# Patient Record
Sex: MALE | Race: WHITE | NOT HISPANIC OR LATINO | URBAN - METROPOLITAN AREA
[De-identification: names, ages, dates, MRNs, and addresses within clinical notes are randomized per-mention and may not be internally consistent; named-entity substitution may affect disease eponyms.]

---

## 2020-12-01 ENCOUNTER — OFFICE VISIT (OUTPATIENT)
Dept: URBAN - METROPOLITAN AREA CLINIC 121 | Facility: CLINIC | Age: 65
End: 2020-12-01

## 2020-12-23 ENCOUNTER — WALK IN (OUTPATIENT)
Dept: URGENT CARE | Age: 65
End: 2020-12-23

## 2020-12-23 VITALS
OXYGEN SATURATION: 97 % | SYSTOLIC BLOOD PRESSURE: 112 MMHG | DIASTOLIC BLOOD PRESSURE: 88 MMHG | HEART RATE: 108 BPM | TEMPERATURE: 99.6 F | RESPIRATION RATE: 16 BRPM

## 2020-12-23 DIAGNOSIS — R50.9 FEVER, UNSPECIFIED FEVER CAUSE: Primary | ICD-10-CM

## 2020-12-23 DIAGNOSIS — R11.10 VOMITING AND DIARRHEA: ICD-10-CM

## 2020-12-23 DIAGNOSIS — R19.7 VOMITING AND DIARRHEA: ICD-10-CM

## 2020-12-23 LAB
ANION GAP SERPL CALC-SCNC: 15 MMOL/L (ref 10–20)
BASOPHILS # BLD: 0 K/MCL (ref 0–0.3)
BASOPHILS NFR BLD: 0 %
BUN SERPL-MCNC: 22 MG/DL (ref 6–20)
BUN/CREAT SERPL: 20 (ref 7–25)
CALCIUM SERPL-MCNC: 10.1 MG/DL (ref 8.4–10.2)
CHLORIDE SERPL-SCNC: 103 MMOL/L (ref 98–107)
CO2 SERPL-SCNC: 25 MMOL/L (ref 21–32)
CREAT SERPL-MCNC: 1.1 MG/DL (ref 0.67–1.17)
DEPRECATED RDW RBC: 46.5 FL (ref 39–50)
EOSINOPHIL # BLD: 0 K/MCL (ref 0–0.5)
EOSINOPHIL NFR BLD: 0 %
ERYTHROCYTE [DISTWIDTH] IN BLOOD: 14.3 % (ref 11–15)
FASTING DURATION TIME PATIENT: ABNORMAL H
GFR SERPLBLD BASED ON 1.73 SQ M-ARVRAT: 70 ML/MIN/1.73M2
GLUCOSE SERPL-MCNC: 131 MG/DL (ref 65–99)
HCT VFR BLD CALC: 42.7 % (ref 39–51)
HGB BLD-MCNC: 15 G/DL (ref 13–17)
LYMPHOCYTES # BLD: 0.3 K/MCL (ref 1–4)
LYMPHOCYTES NFR BLD: 4 %
MCH RBC QN AUTO: 32 PG (ref 26–34)
MCHC RBC AUTO-ENTMCNC: 35.1 G/DL (ref 32–36.5)
MCV RBC AUTO: 91 FL (ref 78–100)
MONOCYTES # BLD: 0.4 K/MCL (ref 0.3–0.9)
MONOCYTES NFR BLD: 6 %
NEUTROPHILS # BLD: 6.1 K/MCL (ref 1.8–7.7)
NEUTROPHILS NFR BLD: 90 %
PLATELET # BLD AUTO: 151 K/MCL (ref 140–450)
POTASSIUM SERPL-SCNC: 4.1 MMOL/L (ref 3.4–5.1)
RAINBOW EXTRA TUBES HOLD SPECIMEN: NORMAL
RBC # BLD: 4.69 MIL/MCL (ref 4.5–5.9)
SARS-COV+SARS-COV-2 AG RESP QL IA.RAPID: NOT DETECTED
SODIUM SERPL-SCNC: 139 MMOL/L (ref 135–145)
WBC # BLD: 6.9 K/MCL (ref 4.2–11)

## 2020-12-23 PROCEDURE — 96360 HYDRATION IV INFUSION INIT: CPT | Performed by: PHYSICIAN ASSISTANT

## 2020-12-23 PROCEDURE — 80048 BASIC METABOLIC PNL TOTAL CA: CPT | Performed by: INTERNAL MEDICINE

## 2020-12-23 PROCEDURE — 99204 OFFICE O/P NEW MOD 45 MIN: CPT | Performed by: PHYSICIAN ASSISTANT

## 2020-12-23 PROCEDURE — 85025 COMPLETE CBC W/AUTO DIFF WBC: CPT | Performed by: INTERNAL MEDICINE

## 2020-12-23 PROCEDURE — 87426 SARSCOV CORONAVIRUS AG IA: CPT | Performed by: PHYSICIAN ASSISTANT

## 2020-12-23 PROCEDURE — 36415 COLL VENOUS BLD VENIPUNCTURE: CPT | Performed by: PHYSICIAN ASSISTANT

## 2020-12-23 RX ORDER — ONDANSETRON 4 MG/1
4 TABLET, ORALLY DISINTEGRATING ORAL EVERY 8 HOURS PRN
Qty: 21 TABLET | Refills: 0 | Status: SHIPPED | OUTPATIENT
Start: 2020-12-23

## 2020-12-23 RX ORDER — ONDANSETRON 4 MG/1
4 TABLET, ORALLY DISINTEGRATING ORAL EVERY 8 HOURS PRN
Qty: 21 TABLET | Refills: 0 | Status: SHIPPED | OUTPATIENT
Start: 2020-12-23 | End: 2020-12-23 | Stop reason: SDUPTHER

## 2020-12-23 RX ORDER — ALLOPURINOL 300 MG/1
300 TABLET ORAL DAILY
COMMUNITY

## 2021-01-22 ENCOUNTER — OFFICE VISIT (OUTPATIENT)
Dept: URBAN - METROPOLITAN AREA CLINIC 63 | Facility: CLINIC | Age: 66
End: 2021-01-22

## 2021-01-28 ENCOUNTER — OFFICE VISIT (OUTPATIENT)
Dept: URBAN - METROPOLITAN AREA SURGERY CENTER 4 | Facility: SURGERY CENTER | Age: 66
End: 2021-01-28

## 2021-02-01 LAB — PATHOLOGY (INDENTED REPORT): (no result)

## 2021-02-23 ENCOUNTER — OFFICE VISIT (OUTPATIENT)
Dept: URBAN - METROPOLITAN AREA CLINIC 63 | Facility: CLINIC | Age: 66
End: 2021-02-23

## 2021-10-13 ENCOUNTER — OFFICE VISIT (OUTPATIENT)
Dept: URBAN - METROPOLITAN AREA CLINIC 121 | Facility: CLINIC | Age: 66
End: 2021-10-13

## 2022-05-13 ENCOUNTER — OFFICE VISIT (OUTPATIENT)
Dept: URBAN - METROPOLITAN AREA CLINIC 63 | Facility: CLINIC | Age: 67
End: 2022-05-13

## 2022-07-09 ENCOUNTER — TELEPHONE ENCOUNTER (OUTPATIENT)
Dept: URBAN - METROPOLITAN AREA CLINIC 121 | Facility: CLINIC | Age: 67
End: 2022-07-09

## 2022-07-09 RX ORDER — OMEPRAZOLE 20 MG/1
CAPSULE, DELAYED RELEASE ORAL ONCE A DAY
Refills: 0 | OUTPATIENT
Start: 2021-02-23 | End: 2022-05-13

## 2022-07-09 RX ORDER — AMLODIPINE BESYLATE 10 MG/1
TABLET ORAL ONCE A DAY
Refills: 0 | OUTPATIENT
Start: 2021-02-23 | End: 2022-05-13

## 2022-07-09 RX ORDER — ATORVASTATIN CALCIUM 10 MG/1
TABLET, FILM COATED ORAL ONCE A DAY
Refills: 0 | OUTPATIENT
Start: 2021-01-21 | End: 2021-01-22

## 2022-07-09 RX ORDER — ATORVASTATIN CALCIUM 10 MG/1
TABLET, FILM COATED ORAL ONCE A DAY
Refills: 0 | OUTPATIENT
Start: 2021-02-23 | End: 2022-05-13

## 2022-07-09 RX ORDER — ALLOPURINOL 300 MG/1
TABLET ORAL ONCE A DAY
Refills: 0 | OUTPATIENT
Start: 2021-01-21 | End: 2021-01-22

## 2022-07-09 RX ORDER — LISINOPRIL 5 MG/1
TABLET ORAL ONCE A DAY
Refills: 0 | OUTPATIENT
Start: 2017-01-04 | End: 2017-03-20

## 2022-07-09 RX ORDER — ALLOPURINOL 300 MG/1
TABLET ORAL ONCE A DAY
Refills: 0 | OUTPATIENT
Start: 2017-01-04 | End: 2021-01-21

## 2022-07-09 RX ORDER — ALLOPURINOL 300 MG/1
TABLET ORAL ONCE A DAY
Refills: 0 | OUTPATIENT
Start: 2021-01-22 | End: 2021-02-23

## 2022-07-09 RX ORDER — OMEPRAZOLE 20 MG/1
ONCE A DAY CAPSULE, DELAYED RELEASE ORAL ONCE A DAY
Refills: 3 | OUTPATIENT
Start: 2017-07-25 | End: 2017-05-04

## 2022-07-09 RX ORDER — OMEPRAZOLE 20 MG/1
CAPSULE, DELAYED RELEASE ORAL ONCE A DAY
Refills: 0 | OUTPATIENT
Start: 2021-01-22 | End: 2021-02-23

## 2022-07-09 RX ORDER — ALLOPURINOL 300 MG/1
TABLET ORAL ONCE A DAY
Refills: 0 | OUTPATIENT
Start: 2021-02-23 | End: 2022-05-13

## 2022-07-09 RX ORDER — AMLODIPINE BESYLATE 10 MG/1
TABLET ORAL ONCE A DAY
Refills: 0 | OUTPATIENT
Start: 2021-01-22 | End: 2021-02-23

## 2022-07-09 RX ORDER — AMLODIPINE BESYLATE 10 MG/1
TABLET ORAL ONCE A DAY
Refills: 0 | OUTPATIENT
Start: 2021-01-21 | End: 2021-01-22

## 2022-07-09 RX ORDER — ATORVASTATIN CALCIUM 10 MG/1
TABLET, FILM COATED ORAL ONCE A DAY
Refills: 0 | OUTPATIENT
Start: 2021-01-22 | End: 2021-02-23

## 2022-07-09 RX ORDER — LISINOPRIL 40 MG/1
TABLET ORAL ONCE A DAY
Refills: 0 | OUTPATIENT
Start: 2017-03-20 | End: 2017-03-20

## 2022-07-09 RX ORDER — LISINOPRIL 20 MG/1
TABLET ORAL ONCE A DAY
Refills: 0 | OUTPATIENT
Start: 2017-03-20 | End: 2021-01-21

## 2022-07-09 RX ORDER — OMEPRAZOLE 20 MG/1
CAPSULE, DELAYED RELEASE ORAL ONCE A DAY
Refills: 0 | OUTPATIENT
Start: 2021-01-21 | End: 2021-01-22

## 2022-07-10 ENCOUNTER — TELEPHONE ENCOUNTER (OUTPATIENT)
Dept: URBAN - METROPOLITAN AREA CLINIC 121 | Facility: CLINIC | Age: 67
End: 2022-07-10

## 2022-07-10 RX ORDER — EVOLOCUMAB 140 MG/ML
EVERY 2 WEEKS INJECTION, SOLUTION SUBCUTANEOUS TAKE AS DIRECTED
Refills: 0 | Status: ACTIVE | COMMUNITY
Start: 2022-05-13

## 2022-07-10 RX ORDER — OMEPRAZOLE 40 MG/1
TWICE A DAY CAPSULE, DELAYED RELEASE ORAL TWICE A DAY
Refills: 3 | Status: ACTIVE | COMMUNITY
Start: 2017-03-20

## 2022-07-10 RX ORDER — OMEPRAZOLE 40 MG/1
ONCE A DAY 30-60 MINUTES BEFORE BREAKFAST CAPSULE, DELAYED RELEASE ORAL ONCE A DAY
Refills: 1 | Status: ACTIVE | COMMUNITY
Start: 2017-05-24

## 2022-07-10 RX ORDER — ALLOPURINOL 300 MG/1
TABLET ORAL ONCE A DAY
Refills: 0 | Status: ACTIVE | COMMUNITY
Start: 2022-05-13

## 2022-07-10 RX ORDER — OMEPRAZOLE 20 MG/1
CAPSULE, DELAYED RELEASE ORAL ONCE A DAY
Refills: 0 | Status: ACTIVE | COMMUNITY
Start: 2022-05-13

## 2022-07-10 RX ORDER — OMEPRAZOLE 20 MG/1
TAKE ONE CAPSULE BY MOUTH DAILY CAPSULE, DELAYED RELEASE ORAL
Refills: 0 | Status: ACTIVE | COMMUNITY
Start: 2018-01-22

## 2022-07-10 RX ORDER — METOPROLOL SUCCINATE 25 MG/1
TABLET, EXTENDED RELEASE ORAL ONCE A DAY
Refills: 0 | Status: ACTIVE | COMMUNITY
Start: 2022-05-13

## 2022-07-10 RX ORDER — AMLODIPINE BESYLATE 2.5 MG/1
TABLET ORAL ONCE A DAY
Refills: 0 | Status: ACTIVE | COMMUNITY
Start: 2022-05-13

## 2022-07-10 RX ORDER — OMEPRAZOLE 20 MG/1
ONCE A DAY CAPSULE, DELAYED RELEASE ORAL ONCE A DAY
Refills: 1 | Status: ACTIVE | COMMUNITY
Start: 2017-07-28

## 2023-02-10 ENCOUNTER — TELEPHONE ENCOUNTER (OUTPATIENT)
Dept: URBAN - METROPOLITAN AREA CLINIC 7 | Facility: CLINIC | Age: 68
End: 2023-02-10

## 2023-03-24 ENCOUNTER — OFFICE VISIT (OUTPATIENT)
Dept: URBAN - METROPOLITAN AREA CLINIC 63 | Facility: CLINIC | Age: 68
End: 2023-03-24

## 2023-07-19 ENCOUNTER — IMAGING SERVICES (OUTPATIENT)
Dept: GENERAL RADIOLOGY | Age: 68
End: 2023-07-19
Attending: NURSE PRACTITIONER

## 2023-07-19 ENCOUNTER — WALK IN (OUTPATIENT)
Dept: URGENT CARE | Age: 68
End: 2023-07-19

## 2023-07-19 ENCOUNTER — TELEPHONE (OUTPATIENT)
Dept: URGENT CARE | Age: 68
End: 2023-07-19

## 2023-07-19 ENCOUNTER — LAB SERVICES (OUTPATIENT)
Dept: LAB | Age: 68
End: 2023-07-19

## 2023-07-19 VITALS
OXYGEN SATURATION: 97 % | HEART RATE: 74 BPM | BODY MASS INDEX: 33.37 KG/M2 | RESPIRATION RATE: 16 BRPM | SYSTOLIC BLOOD PRESSURE: 134 MMHG | WEIGHT: 260 LBS | HEIGHT: 74 IN | TEMPERATURE: 98 F | DIASTOLIC BLOOD PRESSURE: 98 MMHG

## 2023-07-19 DIAGNOSIS — R10.9 ABDOMINAL PAIN, UNSPECIFIED ABDOMINAL LOCATION: Primary | ICD-10-CM

## 2023-07-19 DIAGNOSIS — R10.9 ABDOMINAL PAIN, UNSPECIFIED ABDOMINAL LOCATION: ICD-10-CM

## 2023-07-19 LAB
BASOPHILS # BLD: 0.1 K/MCL (ref 0–0.3)
BASOPHILS NFR BLD: 1 %
DEPRECATED RDW RBC: 56.2 FL (ref 39–50)
EOSINOPHIL # BLD: 0.3 K/MCL (ref 0–0.5)
EOSINOPHIL NFR BLD: 5 %
ERYTHROCYTE [DISTWIDTH] IN BLOOD: 15.5 % (ref 11–15)
HCT VFR BLD CALC: 42.9 % (ref 39–51)
HGB BLD-MCNC: 14 G/DL (ref 13–17)
IMM GRANULOCYTES # BLD AUTO: 0 K/MCL (ref 0–0.2)
IMM GRANULOCYTES # BLD: 0 %
LYMPHOCYTES # BLD: 1.3 K/MCL (ref 1–4)
LYMPHOCYTES NFR BLD: 21 %
MCH RBC QN AUTO: 31.7 PG (ref 26–34)
MCHC RBC AUTO-ENTMCNC: 32.6 G/DL (ref 32–36.5)
MCV RBC AUTO: 97.3 FL (ref 78–100)
MONOCYTES # BLD: 0.6 K/MCL (ref 0.3–0.9)
MONOCYTES NFR BLD: 9 %
NEUTROPHILS # BLD: 3.8 K/MCL (ref 1.8–7.7)
NEUTROPHILS NFR BLD: 64 %
PLATELET # BLD AUTO: 162 K/MCL (ref 140–450)
RBC # BLD: 4.41 MIL/MCL (ref 4.5–5.9)
WBC # BLD: 6 K/MCL (ref 4.2–11)

## 2023-07-19 PROCEDURE — 74022 RADEX COMPL AQT ABD SERIES: CPT | Performed by: RADIOLOGY

## 2023-07-19 PROCEDURE — 36415 COLL VENOUS BLD VENIPUNCTURE: CPT | Performed by: INTERNAL MEDICINE

## 2023-07-19 PROCEDURE — 85025 COMPLETE CBC W/AUTO DIFF WBC: CPT | Performed by: INTERNAL MEDICINE

## 2023-07-19 PROCEDURE — 99214 OFFICE O/P EST MOD 30 MIN: CPT | Performed by: NURSE PRACTITIONER

## 2023-07-19 RX ORDER — OMEPRAZOLE 20 MG/1
CAPSULE, DELAYED RELEASE ORAL
COMMUNITY
Start: 2023-05-06

## 2023-07-19 RX ORDER — TAMSULOSIN HYDROCHLORIDE 0.4 MG/1
CAPSULE ORAL
COMMUNITY
Start: 2023-05-14

## 2023-07-19 RX ORDER — APIXABAN 5 MG/1
TABLET, FILM COATED ORAL
COMMUNITY
Start: 2023-05-06

## 2023-07-19 RX ORDER — METOPROLOL SUCCINATE 25 MG/1
TABLET, EXTENDED RELEASE ORAL
COMMUNITY
Start: 2023-05-06

## 2023-07-19 RX ORDER — AMOXICILLIN AND CLAVULANATE POTASSIUM 875; 125 MG/1; MG/1
1 TABLET, FILM COATED ORAL EVERY 12 HOURS
Qty: 14 TABLET | Refills: 0 | Status: SHIPPED | OUTPATIENT
Start: 2023-07-19 | End: 2023-07-26

## 2023-11-07 ENCOUNTER — TELEPHONE ENCOUNTER (OUTPATIENT)
Dept: URBAN - METROPOLITAN AREA CLINIC 63 | Facility: CLINIC | Age: 68
End: 2023-11-07

## 2023-11-07 ENCOUNTER — OFFICE VISIT (OUTPATIENT)
Dept: URBAN - METROPOLITAN AREA CLINIC 63 | Facility: CLINIC | Age: 68
End: 2023-11-07
Payer: COMMERCIAL

## 2023-11-07 VITALS
HEIGHT: 74 IN | DIASTOLIC BLOOD PRESSURE: 80 MMHG | TEMPERATURE: 98.2 F | OXYGEN SATURATION: 89 % | WEIGHT: 257 LBS | SYSTOLIC BLOOD PRESSURE: 130 MMHG | HEART RATE: 62 BPM | BODY MASS INDEX: 32.98 KG/M2 | RESPIRATION RATE: 20 BRPM

## 2023-11-07 DIAGNOSIS — Z90.49 HISTORY OF HEMICOLECTOMY: ICD-10-CM

## 2023-11-07 DIAGNOSIS — Z87.19 HISTORY OF SMALL BOWEL OBSTRUCTION: ICD-10-CM

## 2023-11-07 DIAGNOSIS — K58.0 IRRITABLE BOWEL SYNDROME WITH DIARRHEA: ICD-10-CM

## 2023-11-07 DIAGNOSIS — Z12.11 ENCOUNTER FOR SCREENING FOR MALIGNANT NEOPLASM OF COLON: ICD-10-CM

## 2023-11-07 PROBLEM — 197125005: Status: ACTIVE | Noted: 2023-11-07

## 2023-11-07 PROBLEM — 428305005: Status: ACTIVE | Noted: 2023-11-07

## 2023-11-07 PROCEDURE — 99214 OFFICE O/P EST MOD 30 MIN: CPT

## 2023-11-07 RX ORDER — APIXABAN 5 MG/1
TABLET, FILM COATED ORAL
Qty: 180 TABLET | Status: ACTIVE | COMMUNITY

## 2023-11-07 RX ORDER — OMEPRAZOLE 40 MG/1
TWICE A DAY CAPSULE, DELAYED RELEASE ORAL TWICE A DAY
Refills: 3 | Status: ACTIVE | COMMUNITY
Start: 2017-03-20

## 2023-11-07 RX ORDER — POLYETHYLENE GLYCOL 3350, SODIUM CHLORIDE, SODIUM BICARBONATE, POTASSIUM CHLORIDE 420; 11.2; 5.72; 1.48 G/4L; G/4L; G/4L; G/4L
AS DIRECTED POWDER, FOR SOLUTION ORAL ONCE
Qty: 4000 | Refills: 0 | OUTPATIENT
Start: 2023-11-07 | End: 2023-11-08

## 2023-11-07 RX ORDER — POLYETHYLENE GLYCOL 3350, SODIUM CHLORIDE, SODIUM BICARBONATE, POTASSIUM CHLORIDE 420; 11.2; 5.72; 1.48 G/4L; G/4L; G/4L; G/4L
AS DIRECTED POWDER, FOR SOLUTION ORAL ONCE
Qty: 4000 | Refills: 0
Start: 2023-11-07 | End: 2023-11-09

## 2023-11-07 RX ORDER — TOBRAMYCIN 3 MG/ML
1 DROP EVERY 2 HOURS FIRST DAY THEN EVERY 4 HOURS WHILE AWAKE FOR 5 DAYS SOLUTION OPHTHALMIC
Qty: 10 MILLILITER | Refills: 0 | Status: ACTIVE | COMMUNITY

## 2023-11-07 RX ORDER — CHOLESTYRAMINE 4 G/9G
POWDER, FOR SUSPENSION ORAL
Qty: 90 EACH | Status: ACTIVE | COMMUNITY

## 2023-11-07 RX ORDER — EVOLOCUMAB 140 MG/ML
EVERY 2 WEEKS INJECTION, SOLUTION SUBCUTANEOUS TAKE AS DIRECTED
Refills: 0 | Status: ACTIVE | COMMUNITY
Start: 2022-05-13

## 2023-11-07 RX ORDER — METOPROLOL SUCCINATE 25 MG/1
TABLET, EXTENDED RELEASE ORAL ONCE A DAY
Refills: 0 | Status: ACTIVE | COMMUNITY
Start: 2022-05-13

## 2023-11-07 RX ORDER — ONDANSETRON HYDROCHLORIDE 4 MG/1
1 TABLET TABLET, FILM COATED ORAL
Qty: 2 | Refills: 0
Start: 2023-11-07

## 2023-11-07 RX ORDER — DICYCLOMINE HYDROCHLORIDE 20 MG/1
1 TABLET TABLET ORAL
Qty: 90 | Refills: 1 | OUTPATIENT
Start: 2023-11-07 | End: 2024-01-06

## 2023-11-07 RX ORDER — ONDANSETRON HYDROCHLORIDE 4 MG/1
1 TABLET TABLET, FILM COATED ORAL
Qty: 2 | Refills: 0 | OUTPATIENT
Start: 2023-11-07

## 2023-11-07 RX ORDER — EVOLOCUMAB 140 MG/ML
INJECTION, SOLUTION SUBCUTANEOUS
Qty: 6 MILLILITER | Status: ACTIVE | COMMUNITY

## 2023-11-07 RX ORDER — UREA 10 %
TAKE 1 TABLET BY MOUTH EVERY DAY LOTION (ML) TOPICAL
Qty: 90 EACH | Refills: 0 | Status: ACTIVE | COMMUNITY

## 2023-11-07 RX ORDER — OMEPRAZOLE 20 MG/1
CAPSULE, DELAYED RELEASE ORAL
Qty: 90 CAPSULE | Status: ACTIVE | COMMUNITY

## 2023-11-07 RX ORDER — DICYCLOMINE HYDROCHLORIDE 20 MG/1
1 TABLET TABLET ORAL
Qty: 90 | Refills: 1
Start: 2023-11-07 | End: 2024-01-07

## 2023-11-07 RX ORDER — TAMSULOSIN HYDROCHLORIDE 0.4 MG/1
CAPSULE ORAL
Qty: 90 CAPSULE | Status: ACTIVE | COMMUNITY

## 2023-11-07 RX ORDER — ALLOPURINOL 300 MG/1
TABLET ORAL ONCE A DAY
Refills: 0 | Status: ACTIVE | COMMUNITY
Start: 2022-05-13

## 2023-11-07 NOTE — HPI-TODAY'S VISIT:
Jhonathan is a 68-year-old male presenting to the office today for evaluation of abdominal pain/cramping. He is also due for colonoscopy. He states that abdominal pain started about 2 years ago and has been intermittent since then. For the past couple of months abdominal pain has been more consistent than in the past. He describes it as pain/cramping mostly located in the left lower quadrant. He states that it will be relieved by a bowel movement. It is aggravated by eating foods and sometimes aggravated by walking as well. He describes his stool as a [place] 5. He states he will go for a walk in the morning after eating breakfast and then have abdominal pain for some time and then have a bowel movement after that and the pain is relieved. He started cholestyramine 1 year ago but stopped it about 2 weeks ago because he thought it was contributing to pain. His last colonoscopy was completed up in [place] and we do not have records. He is due for colonoscopy. He denies overuse of NSAIDs, pyrosis, dysphagia, abdominal mass, dyspepsia, constipation, hematochezia, melena, nausea/vomiting, kidney problems, anorexia, weight loss. He does see a cardiologist and is on blood thinners. He denies pacemaker/stent, defibrillator, COPD, asthma, sleep apnea, pulmonologist. He is not on a GLP-1.

## 2023-11-07 NOTE — HPI-PREVIOUS PROCEDURES
5/17/2022 Small bowel resection, right hemicolectomy, enterotomy with release of small-bowel obstruction  SURGEON/SUPERVISING PHYSICIAN: DANA GOOD MD DATE OF PROCEDURE: 05/17/2022 PREOPERATIVE DIAGNOSIS:  Neoplasm of small bowel and retroperitoneum. POSTOPERATIVE DIAGNOSIS:  Neoplasm of small bowel and retroperitoneum. PROCEDURES:   1.  Small-bowel resection.2.  Right hemicolectomy.3.  Enterotomy with release of small-bowel obstruction. COMPLICATIONS:  None. OPERATIVE FINDINGS:  The patient's abdomen was explored through a midline incision.  He was found to have a small-bowel obstruction with large dilated loops of bowel.  There were multiple diverticula along the sigmoid colon, but no evidence of active inflammation or infection; also, diverticula in the right colon.  The patient had a large mass, approximately 10-15 cm in diameter, fixed to the right lower quadrant pelvic wall. The tumor appeared to be emanating from the small bowel.  There were no palpable gastric lesions.  The liver was smooth on the superior and inferior surfaces of both lobes. The small-bowel resection and right hemicolectomy were performed en bloc and sent to pathology.  There was a question of a positive margin along the mesenteric border; therefore, retroperitoneal dissection was performed.  The right ureter was identified with a stent previously placed and further retroperitoneal tissue was excised.  A suture marked the old margin. There was no other firm tissue evident.  The retroperitoneal tissue was dissected off, including a portion of the adventitia of the common iliac artery.  There were no other abnormalities identified.  There were no complications with the procedure

## 2023-11-14 ENCOUNTER — LAB OUTSIDE AN ENCOUNTER (OUTPATIENT)
Dept: URBAN - METROPOLITAN AREA CLINIC 63 | Facility: CLINIC | Age: 68
End: 2023-11-14

## 2023-11-22 ENCOUNTER — TELEPHONE ENCOUNTER (OUTPATIENT)
Dept: URBAN - METROPOLITAN AREA CLINIC 23 | Facility: CLINIC | Age: 68
End: 2023-11-22

## 2023-11-27 ENCOUNTER — TELEPHONE ENCOUNTER (OUTPATIENT)
Dept: URBAN - METROPOLITAN AREA CLINIC 63 | Facility: CLINIC | Age: 68
End: 2023-11-27

## 2023-12-01 ENCOUNTER — TELEPHONE ENCOUNTER (OUTPATIENT)
Dept: URBAN - METROPOLITAN AREA CLINIC 63 | Facility: CLINIC | Age: 68
End: 2023-12-01

## 2023-12-05 ENCOUNTER — LAB OUTSIDE AN ENCOUNTER (OUTPATIENT)
Dept: URBAN - METROPOLITAN AREA SURGERY CENTER 4 | Facility: SURGERY CENTER | Age: 68
End: 2023-12-05

## 2023-12-05 ENCOUNTER — OUT OF OFFICE VISIT (OUTPATIENT)
Dept: URBAN - METROPOLITAN AREA SURGERY CENTER 4 | Facility: SURGERY CENTER | Age: 68
End: 2023-12-05
Payer: COMMERCIAL

## 2023-12-05 DIAGNOSIS — Z12.11 COLON CANCER SCREENING (HIGH RISK): ICD-10-CM

## 2023-12-05 DIAGNOSIS — K64.0 GRADE I INTERNAL HEMORRHOIDS: ICD-10-CM

## 2023-12-05 DIAGNOSIS — Z12.11 ENCOUNTER FOR SCREENING FOR MALIGNANT NEOPLASM OF COLON: ICD-10-CM

## 2023-12-05 DIAGNOSIS — Z53.8 PROCEDURE AND TREATMENT NOT CARRIED OUT FOR OTHER REASONS: ICD-10-CM

## 2023-12-05 DIAGNOSIS — K57.30 DIVERTICULOSIS OF LARGE INTESTINE WITHOUT PERFORATION OR ABSCESS WITHOUT BLEEDING: ICD-10-CM

## 2023-12-05 DIAGNOSIS — K57.30 DIVERTICULA, COLON: ICD-10-CM

## 2023-12-05 DIAGNOSIS — Z53.8 PROCEDURE NOT CARRIED OUT FOR OTHER REASONS: ICD-10-CM

## 2023-12-05 DIAGNOSIS — Z90.49 HISTORY OF HEMICOLECTOMY: ICD-10-CM

## 2023-12-05 DIAGNOSIS — K64.0 FIRST DEGREE HEMORRHOIDS: ICD-10-CM

## 2023-12-05 PROCEDURE — 00811 ANES LWR INTST NDSC NOS: CPT | Performed by: NURSE ANESTHETIST, CERTIFIED REGISTERED

## 2023-12-05 PROCEDURE — 45378 DIAGNOSTIC COLONOSCOPY: CPT | Performed by: INTERNAL MEDICINE

## 2023-12-05 RX ORDER — EVOLOCUMAB 140 MG/ML
EVERY 2 WEEKS INJECTION, SOLUTION SUBCUTANEOUS TAKE AS DIRECTED
Refills: 0 | Status: ACTIVE | COMMUNITY
Start: 2022-05-13

## 2023-12-05 RX ORDER — METOPROLOL SUCCINATE 25 MG/1
TABLET, EXTENDED RELEASE ORAL ONCE A DAY
Refills: 0 | Status: ACTIVE | COMMUNITY
Start: 2022-05-13

## 2023-12-05 RX ORDER — OMEPRAZOLE 20 MG/1
CAPSULE, DELAYED RELEASE ORAL
Qty: 90 CAPSULE | Status: ACTIVE | COMMUNITY

## 2023-12-05 RX ORDER — DICYCLOMINE HYDROCHLORIDE 20 MG/1
1 TABLET TABLET ORAL
Qty: 90 | Refills: 1 | Status: ACTIVE | COMMUNITY
Start: 2023-11-07 | End: 2024-01-07

## 2023-12-05 RX ORDER — OMEPRAZOLE 40 MG/1
TWICE A DAY CAPSULE, DELAYED RELEASE ORAL TWICE A DAY
Refills: 3 | Status: ACTIVE | COMMUNITY
Start: 2017-03-20

## 2023-12-05 RX ORDER — ONDANSETRON HYDROCHLORIDE 4 MG/1
1 TABLET TABLET, FILM COATED ORAL
Qty: 2 | Refills: 0 | Status: ACTIVE | COMMUNITY
Start: 2023-11-07

## 2023-12-05 RX ORDER — ALLOPURINOL 300 MG/1
TABLET ORAL ONCE A DAY
Refills: 0 | Status: ACTIVE | COMMUNITY
Start: 2022-05-13

## 2023-12-05 RX ORDER — APIXABAN 5 MG/1
TABLET, FILM COATED ORAL
Qty: 180 TABLET | Status: ACTIVE | COMMUNITY

## 2023-12-05 RX ORDER — TAMSULOSIN HYDROCHLORIDE 0.4 MG/1
CAPSULE ORAL
Qty: 90 CAPSULE | Status: ACTIVE | COMMUNITY

## 2023-12-05 RX ORDER — UREA 10 %
TAKE 1 TABLET BY MOUTH EVERY DAY LOTION (ML) TOPICAL
Qty: 90 EACH | Refills: 0 | Status: ACTIVE | COMMUNITY

## 2023-12-05 RX ORDER — CHOLESTYRAMINE 4 G/9G
POWDER, FOR SUSPENSION ORAL
Qty: 90 EACH | Status: ACTIVE | COMMUNITY

## 2023-12-05 RX ORDER — TOBRAMYCIN 3 MG/ML
1 DROP EVERY 2 HOURS FIRST DAY THEN EVERY 4 HOURS WHILE AWAKE FOR 5 DAYS SOLUTION OPHTHALMIC
Qty: 10 MILLILITER | Refills: 0 | Status: ACTIVE | COMMUNITY

## 2023-12-05 RX ORDER — EVOLOCUMAB 140 MG/ML
INJECTION, SOLUTION SUBCUTANEOUS
Qty: 6 MILLILITER | Status: ACTIVE | COMMUNITY

## 2023-12-07 ENCOUNTER — TELEPHONE ENCOUNTER (OUTPATIENT)
Dept: URBAN - METROPOLITAN AREA CLINIC 68 | Facility: CLINIC | Age: 68
End: 2023-12-07

## 2023-12-18 ENCOUNTER — TELEPHONE ENCOUNTER (OUTPATIENT)
Dept: URBAN - METROPOLITAN AREA CLINIC 63 | Facility: CLINIC | Age: 68
End: 2023-12-18

## 2023-12-19 ENCOUNTER — OFFICE VISIT (OUTPATIENT)
Dept: URBAN - METROPOLITAN AREA CLINIC 63 | Facility: CLINIC | Age: 68
End: 2023-12-19
Payer: COMMERCIAL

## 2023-12-19 ENCOUNTER — LAB OUTSIDE AN ENCOUNTER (OUTPATIENT)
Dept: URBAN - METROPOLITAN AREA CLINIC 63 | Facility: CLINIC | Age: 68
End: 2023-12-19

## 2023-12-19 ENCOUNTER — TELEPHONE ENCOUNTER (OUTPATIENT)
Dept: URBAN - METROPOLITAN AREA CLINIC 7 | Facility: CLINIC | Age: 68
End: 2023-12-19

## 2023-12-19 VITALS
OXYGEN SATURATION: 95 % | TEMPERATURE: 97.6 F | SYSTOLIC BLOOD PRESSURE: 135 MMHG | WEIGHT: 250 LBS | DIASTOLIC BLOOD PRESSURE: 80 MMHG | HEIGHT: 74 IN | BODY MASS INDEX: 32.08 KG/M2 | HEART RATE: 65 BPM

## 2023-12-19 DIAGNOSIS — Z90.49 HISTORY OF HEMICOLECTOMY: ICD-10-CM

## 2023-12-19 DIAGNOSIS — Z12.11 ENCOUNTER FOR SCREENING FOR MALIGNANT NEOPLASM OF COLON: ICD-10-CM

## 2023-12-19 DIAGNOSIS — K58.0 IRRITABLE BOWEL SYNDROME WITH DIARRHEA: ICD-10-CM

## 2023-12-19 PROBLEM — 429699009: Status: ACTIVE | Noted: 2023-12-19

## 2023-12-19 PROCEDURE — 99214 OFFICE O/P EST MOD 30 MIN: CPT

## 2023-12-19 RX ORDER — CHOLESTYRAMINE 4 G/9G
POWDER, FOR SUSPENSION ORAL
Qty: 90 EACH | Status: ACTIVE | COMMUNITY

## 2023-12-19 RX ORDER — METOPROLOL SUCCINATE 25 MG/1
TABLET, EXTENDED RELEASE ORAL ONCE A DAY
Refills: 0 | Status: ACTIVE | COMMUNITY
Start: 2022-05-13

## 2023-12-19 RX ORDER — OMEPRAZOLE 40 MG/1
TWICE A DAY CAPSULE, DELAYED RELEASE ORAL TWICE A DAY
Refills: 3 | Status: ACTIVE | COMMUNITY
Start: 2017-03-20

## 2023-12-19 RX ORDER — EVOLOCUMAB 140 MG/ML
EVERY 2 WEEKS INJECTION, SOLUTION SUBCUTANEOUS TAKE AS DIRECTED
Refills: 0 | Status: ACTIVE | COMMUNITY
Start: 2022-05-13

## 2023-12-19 RX ORDER — TAMSULOSIN HYDROCHLORIDE 0.4 MG/1
CAPSULE ORAL
Qty: 90 CAPSULE | Status: ACTIVE | COMMUNITY

## 2023-12-19 RX ORDER — TOBRAMYCIN 3 MG/ML
1 DROP EVERY 2 HOURS FIRST DAY THEN EVERY 4 HOURS WHILE AWAKE FOR 5 DAYS SOLUTION OPHTHALMIC
Qty: 10 MILLILITER | Refills: 0 | Status: ACTIVE | COMMUNITY

## 2023-12-19 RX ORDER — ONDANSETRON HYDROCHLORIDE 4 MG/1
1 TABLET TABLET, FILM COATED ORAL
Qty: 2 | Refills: 0 | Status: ACTIVE | COMMUNITY
Start: 2023-11-07

## 2023-12-19 RX ORDER — EVOLOCUMAB 140 MG/ML
INJECTION, SOLUTION SUBCUTANEOUS
Qty: 6 MILLILITER | Status: ACTIVE | COMMUNITY

## 2023-12-19 RX ORDER — OMEPRAZOLE 20 MG/1
CAPSULE, DELAYED RELEASE ORAL
Qty: 90 CAPSULE | Status: ACTIVE | COMMUNITY

## 2023-12-19 RX ORDER — UREA 10 %
TAKE 1 TABLET BY MOUTH EVERY DAY LOTION (ML) TOPICAL
Qty: 90 EACH | Refills: 0 | Status: ACTIVE | COMMUNITY

## 2023-12-19 RX ORDER — ALLOPURINOL 300 MG/1
TABLET ORAL ONCE A DAY
Refills: 0 | Status: ACTIVE | COMMUNITY
Start: 2022-05-13

## 2023-12-19 RX ORDER — APIXABAN 5 MG/1
TABLET, FILM COATED ORAL
Qty: 180 TABLET | Status: ACTIVE | COMMUNITY

## 2023-12-19 RX ORDER — DICYCLOMINE HYDROCHLORIDE 20 MG/1
1 TABLET TABLET ORAL
Qty: 90 | Refills: 1 | Status: ACTIVE | COMMUNITY
Start: 2023-11-07 | End: 2024-01-07

## 2023-12-19 RX ORDER — POLYETHYLENE GLYCOL 3350, SODIUM CHLORIDE, SODIUM BICARBONATE, POTASSIUM CHLORIDE 420; 11.2; 5.72; 1.48 G/4L; G/4L; G/4L; G/4L
AS DIRECTED POWDER, FOR SOLUTION ORAL ONCE
Qty: 4000 | Refills: 0 | OUTPATIENT
Start: 2023-12-19 | End: 2023-12-20

## 2023-12-19 RX ORDER — AMITRIPTYLINE HYDROCHLORIDE 10 MG/1
1 TABLET AT BEDTIME TABLET, FILM COATED ORAL ONCE A DAY
Qty: 30 | Refills: 1 | OUTPATIENT
Start: 2023-12-19

## 2023-12-19 NOTE — HPI-TODAY'S VISIT:
Jhonathan is a 68-year-old male presenting to the office today for follow-up visit. He did have a colonoscopy on 12/5/2023 but procedure had to be aborted due to restricted mobility of colon and he was set up to get a CT colonography. He went to the CT colonography visit and left because he was under the impression that they were going to be blowing air of his penis. He has been doing well since the colonoscopy. He states that his abdominal pain has gotten better but it is still problematic. He does continue to have some diarrhea, states he is going multiple times per day and stools are not as formed as they should be and so more watery. Otherwise, he is doing well and has no other complaints, questions, concerns. He denies melena, hematochezia, blood when wiping, change in stool caliber, nausea/vomiting, hematemesis, unintentional weight loss. . . Colonoscopy 12/5/2023; - Nonbleeding internal hemorrhoids - Mild diverticulosis of the rectosigmoid colon. There is no evidence of diverticular bleeding - No specimens collected. - The procedure was aborted due to restricted mobility of the colon . Abdomen 2 views 12/6/2023; - Impression; 1. No bowel obstruction, free air, or significant retained stool within the colon . Discussed 8/9/2023 labs . .  5/17/2022 Small bowel resection, right hemicolectomy, enterotomy with release of small-bowel obstruction  SURGEON/SUPERVISING PHYSICIAN: DANA GOOD MD DATE OF PROCEDURE: 05/17/2022 PREOPERATIVE DIAGNOSIS: Neoplasm of small bowel and retroperitoneum. POSTOPERATIVE DIAGNOSIS: Neoplasm of small bowel and retroperitoneum. PROCEDURES:   1. Small-bowel resection.2. Right hemicolectomy.3. Enterotomy with release of small-bowel obstruction. COMPLICATIONS: None. OPERATIVE FINDINGS: The patient's abdomen was explored through a midline incision.  He was found to have a small-bowel obstruction with large dilated loops of bowel.  There were multiple diverticula along the sigmoid colon, but no evidence of active inflammation or infection; also, diverticula in the right colon. The patient had a large mass, approximately 10-15 cm in diameter, fixed to the right lower quadrant pelvic wall. The tumor appeared to be emanating from the small bowel. There were no palpable gastric lesions. The liver was smooth on the superior and inferior surfaces of both lobes. The small-bowel resection and right hemicolectomy were performed en bloc and sent to pathology. There was a question of a positive margin along the mesenteric border; therefore, retroperitoneal dissection was performed. The right ureter was identified with a stent previously placed and further retroperitoneal tissue was excised. A suture marked the old margin. There was no other firm tissue evident. The retroperitoneal tissue was dissected off, including a portion of the adventitia of the common iliac artery. There were no other abnormalities identified. There were no complications with the procedure

## 2024-01-08 ENCOUNTER — TELEPHONE ENCOUNTER (OUTPATIENT)
Dept: URBAN - METROPOLITAN AREA CLINIC 63 | Facility: CLINIC | Age: 69
End: 2024-01-08

## 2024-01-09 ENCOUNTER — OFFICE VISIT (OUTPATIENT)
Dept: URBAN - METROPOLITAN AREA CLINIC 63 | Facility: CLINIC | Age: 69
End: 2024-01-09
Payer: COMMERCIAL

## 2024-01-09 VITALS
HEART RATE: 80 BPM | SYSTOLIC BLOOD PRESSURE: 120 MMHG | DIASTOLIC BLOOD PRESSURE: 70 MMHG | HEIGHT: 74 IN | WEIGHT: 247 LBS | OXYGEN SATURATION: 97 % | BODY MASS INDEX: 31.7 KG/M2 | TEMPERATURE: 98.1 F

## 2024-01-09 DIAGNOSIS — K56.699 DIVERTICULAR STRICTURE: ICD-10-CM

## 2024-01-09 DIAGNOSIS — K57.92 DIVERTICULITIS: ICD-10-CM

## 2024-01-09 PROBLEM — 307496006: Status: ACTIVE | Noted: 2024-01-09

## 2024-01-09 PROCEDURE — 99214 OFFICE O/P EST MOD 30 MIN: CPT

## 2024-01-09 RX ORDER — CHOLESTYRAMINE 4 G/9G
POWDER, FOR SUSPENSION ORAL
Qty: 90 EACH | Status: ACTIVE | COMMUNITY

## 2024-01-09 RX ORDER — TOBRAMYCIN 3 MG/ML
1 DROP EVERY 2 HOURS FIRST DAY THEN EVERY 4 HOURS WHILE AWAKE FOR 5 DAYS SOLUTION OPHTHALMIC
Qty: 10 MILLILITER | Refills: 0 | Status: ACTIVE | COMMUNITY

## 2024-01-09 RX ORDER — ALLOPURINOL 300 MG/1
TABLET ORAL ONCE A DAY
Refills: 0 | Status: ACTIVE | COMMUNITY
Start: 2022-05-13

## 2024-01-09 RX ORDER — METRONIDAZOLE 500 MG/1
1 TABLET TABLET ORAL THREE TIMES A DAY
Qty: 30 | OUTPATIENT
Start: 2024-01-09 | End: 2024-01-19

## 2024-01-09 RX ORDER — UREA 10 %
TAKE 1 TABLET BY MOUTH EVERY DAY LOTION (ML) TOPICAL
Qty: 90 EACH | Refills: 0 | Status: ACTIVE | COMMUNITY

## 2024-01-09 RX ORDER — OMEPRAZOLE 40 MG/1
TWICE A DAY CAPSULE, DELAYED RELEASE ORAL TWICE A DAY
Refills: 3 | Status: ACTIVE | COMMUNITY
Start: 2017-03-20

## 2024-01-09 RX ORDER — METOPROLOL SUCCINATE 25 MG/1
TABLET, EXTENDED RELEASE ORAL ONCE A DAY
Refills: 0 | Status: ACTIVE | COMMUNITY
Start: 2022-05-13

## 2024-01-09 RX ORDER — EVOLOCUMAB 140 MG/ML
INJECTION, SOLUTION SUBCUTANEOUS
Qty: 6 MILLILITER | Status: ACTIVE | COMMUNITY

## 2024-01-09 RX ORDER — TAMSULOSIN HYDROCHLORIDE 0.4 MG/1
CAPSULE ORAL
Qty: 90 CAPSULE | Status: ACTIVE | COMMUNITY

## 2024-01-09 RX ORDER — OMEPRAZOLE 20 MG/1
CAPSULE, DELAYED RELEASE ORAL
Qty: 90 CAPSULE | Status: ACTIVE | COMMUNITY

## 2024-01-09 RX ORDER — EVOLOCUMAB 140 MG/ML
EVERY 2 WEEKS INJECTION, SOLUTION SUBCUTANEOUS TAKE AS DIRECTED
Refills: 0 | Status: ACTIVE | COMMUNITY
Start: 2022-05-13

## 2024-01-09 RX ORDER — ONDANSETRON HYDROCHLORIDE 4 MG/1
1 TABLET TABLET, FILM COATED ORAL
Qty: 2 | Refills: 0 | Status: ACTIVE | COMMUNITY
Start: 2023-11-07

## 2024-01-09 RX ORDER — APIXABAN 5 MG/1
TABLET, FILM COATED ORAL
Qty: 180 TABLET | Status: ACTIVE | COMMUNITY

## 2024-01-09 RX ORDER — CIPROFLOXACIN 500 MG/1
1 TABLET TABLET, FILM COATED ORAL
Qty: 20 | OUTPATIENT
Start: 2024-01-09 | End: 2024-01-19

## 2024-01-09 NOTE — HPI-TODAY'S VISIT:
Jhonathan is a 68-year-old male presenting to the office today for follow-up after CT colonography. CT colonography was completed on 1//2024 and results are discussed in detail below. It did show evidence of diverticular stricture with evidence of chronic diverticulitis involving the sigmoid colon. He does have a history of diverticulitis treated with antibiotics in the past. He also has a history of abdominal pain/cramping. This pain has been severe at times. This has been an intermittent problem for the past 2 years. However, for the past few months the pain has been more consistent and he describes it as left lower quadrant pain/cramping. He has also had diarrhea and this has been a problem for the past few months as well. Fiber has helped decrease symptoms but a few times per week he still has diarrhea. He does also have a history of hemicolectomy and small bowel resection for an abdominal mass in 2022. He denies melena, hematochezia, bright red blood per rectum, nausea/vomiting, hematemesis, dysphagia, reflux. . . CT colonography 1/4/2024; - Impression; diverticular stricture with evidence of chronic diverticulitis involving the sigmoid colon prevented air from distending the colon proximally. No discrete colonic mass is identified.

## 2024-01-30 ENCOUNTER — TELEPHONE ENCOUNTER (OUTPATIENT)
Dept: URBAN - METROPOLITAN AREA CLINIC 63 | Facility: CLINIC | Age: 69
End: 2024-01-30

## 2024-01-30 RX ORDER — METRONIDAZOLE 500 MG/1
1 TABLET TABLET ORAL THREE TIMES A DAY
Qty: 30 | OUTPATIENT
Start: 2024-01-30 | End: 2024-02-09

## 2024-01-30 RX ORDER — CIPROFLOXACIN 500 MG/1
1 TABLET TABLET, FILM COATED ORAL
Qty: 20 | OUTPATIENT
Start: 2024-01-30 | End: 2024-02-09

## 2024-03-26 ENCOUNTER — OV EP (OUTPATIENT)
Dept: URBAN - METROPOLITAN AREA CLINIC 63 | Facility: CLINIC | Age: 69
End: 2024-03-26
Payer: MEDICARE

## 2024-03-26 VITALS
SYSTOLIC BLOOD PRESSURE: 110 MMHG | HEART RATE: 92 BPM | TEMPERATURE: 97.5 F | DIASTOLIC BLOOD PRESSURE: 70 MMHG | HEIGHT: 74 IN | BODY MASS INDEX: 27.85 KG/M2 | WEIGHT: 217 LBS | OXYGEN SATURATION: 95 %

## 2024-03-26 DIAGNOSIS — K56.699 DIVERTICULAR STRICTURE: ICD-10-CM

## 2024-03-26 DIAGNOSIS — K57.92 DIVERTICULITIS: ICD-10-CM

## 2024-03-26 PROCEDURE — 99213 OFFICE O/P EST LOW 20 MIN: CPT

## 2024-03-26 RX ORDER — CHOLESTYRAMINE 4 G/9G
POWDER, FOR SUSPENSION ORAL
Qty: 90 EACH | Status: ACTIVE | COMMUNITY

## 2024-03-26 RX ORDER — APIXABAN 5 MG/1
TABLET, FILM COATED ORAL
Qty: 180 TABLET | Status: ACTIVE | COMMUNITY

## 2024-03-26 RX ORDER — ALLOPURINOL 300 MG/1
TABLET ORAL ONCE A DAY
Refills: 0 | Status: ACTIVE | COMMUNITY
Start: 2022-05-13

## 2024-03-26 RX ORDER — ONDANSETRON HYDROCHLORIDE 4 MG/1
1 TABLET TABLET, FILM COATED ORAL
Qty: 2 | Refills: 0 | Status: ACTIVE | COMMUNITY
Start: 2023-11-07

## 2024-03-26 RX ORDER — TAMSULOSIN HYDROCHLORIDE 0.4 MG/1
CAPSULE ORAL
Qty: 90 CAPSULE | Status: ACTIVE | COMMUNITY

## 2024-03-26 RX ORDER — EVOLOCUMAB 140 MG/ML
EVERY 2 WEEKS INJECTION, SOLUTION SUBCUTANEOUS TAKE AS DIRECTED
Refills: 0 | Status: ACTIVE | COMMUNITY
Start: 2022-05-13

## 2024-03-26 RX ORDER — UREA 10 %
TAKE 1 TABLET BY MOUTH EVERY DAY LOTION (ML) TOPICAL
Qty: 90 EACH | Refills: 0 | Status: ACTIVE | COMMUNITY

## 2024-03-26 RX ORDER — TOBRAMYCIN 3 MG/ML
1 DROP EVERY 2 HOURS FIRST DAY THEN EVERY 4 HOURS WHILE AWAKE FOR 5 DAYS SOLUTION OPHTHALMIC
Qty: 10 MILLILITER | Refills: 0 | Status: ACTIVE | COMMUNITY

## 2024-03-26 RX ORDER — METOPROLOL SUCCINATE 25 MG/1
TABLET, EXTENDED RELEASE ORAL ONCE A DAY
Refills: 0 | Status: ACTIVE | COMMUNITY
Start: 2022-05-13

## 2024-03-26 RX ORDER — EVOLOCUMAB 140 MG/ML
INJECTION, SOLUTION SUBCUTANEOUS
Qty: 6 MILLILITER | Status: ACTIVE | COMMUNITY

## 2024-03-26 RX ORDER — OMEPRAZOLE 40 MG/1
TWICE A DAY CAPSULE, DELAYED RELEASE ORAL TWICE A DAY
Refills: 3 | Status: ACTIVE | COMMUNITY
Start: 2017-03-20

## 2024-03-26 RX ORDER — OMEPRAZOLE 20 MG/1
CAPSULE, DELAYED RELEASE ORAL
Qty: 90 CAPSULE | Status: ACTIVE | COMMUNITY

## 2024-03-26 NOTE — PHYSICAL EXAM GASTROINTESTINAL
Abdomen , soft, nontender, nondistended , no guarding or rigidity , no masses palpable , normal bowel sounds ,ostomy bag in place with no erythema/discharge.  Liver and Spleen , no hepatomegaly present , no hepatosplenomegaly , liver nontender , spleen not palpable

## 2024-03-26 NOTE — HPI-TODAY'S VISIT:
Jhonathan is a 68-year-old male presenting to the office today for follow-up on diverticular stricture/recurrent diverticulitis. He did recently have surgery with Dr. Lima for treatment of diverticular stricture/recurrent diverticulitis. Open low anterior resection, splenic flexure takedown, left ureterolysis, extensive lysis of adhesions was performed and patient was hospitalized from 3/4/2024 - 3/15/2024. He is continuing to follow with Dr. Lima and had an appointment last week where he was told that things are progressing well and he is hoping that ostomy reversal can be completed in the future. Per patient, Dr. Lima seemed optimistic that a few months down the road he could have reversal completed. He is recovering well postprocedure and has no complaints, questions, concerns at this time other than the fact that he is really hoping that he can have his ostomy reversed in the future. He did develop intermittent bleeding during hospital stay he did have increased ostomy output as well. He has been having less ostomy output but still continues to have 2 empty his bag 5-6 times per day. He denies abdominal pain, fever, redness/swelling around ostomy, blood in stool. . Colonoscopy 12/5/2023; - Nonbleeding internal hemorrhoids - Mild diverticulosis in the rectosigmoid colon. There was no evidence of diverticular bleeding. - No specimens collected. - The procedure was aborted due to restricted mobility of the colon. . Abdomen 2 views 12/6/2023; - Impression; 1. No bowel obstruction, free air, or significant retained stool within the colon . CT abdomen/pelvis without contrast 5/2/2022; - Impression; - No obstruction. There are findings concerning for a calcified mesenteric mass measuring approximately 5.5 x 4.2 cm. Scan with copious oral and IV contrast . CT abdomen/pelvis with contrast 5/6/2022; - Impression; 1. Calcified mass is suggested at the root of the right lower quadrant small bowel mesentery, with adjacent thickened ileal loops suboptimally evaluated due to the absence of contrast opacification. Dedicated CT enterography evaluation is indicated for further evaluation. 2. No acute bowel obstruction or intraperitoneal free air is seen 3. Hepatomegaly with hepatic steatosis . 3/14/2024 labs; - CBC; RBC 3.87, hemoglobin 11.9, hematocrit 36.4, eosinophil percent 13.8, absolute eosinophil 0.9, otherwise within normal limits - PT/INR; PT 20.5 - BMP; CO2 32, anion gap 1, glucose 106, otherwise within normal limits . CT colonography 1/4/2024; - Impression; diverticular stricture with evidence of chronic diverticulitis involving the sigmoid colon prevented air from distending the colon proximally. No discrete colonic mass is identified.

## 2024-09-10 ENCOUNTER — DASHBOARD ENCOUNTERS (OUTPATIENT)
Age: 69
End: 2024-09-10

## 2024-09-10 ENCOUNTER — TELEPHONE ENCOUNTER (OUTPATIENT)
Dept: URBAN - METROPOLITAN AREA CLINIC 63 | Facility: CLINIC | Age: 69
End: 2024-09-10

## 2024-09-10 ENCOUNTER — OFFICE VISIT (OUTPATIENT)
Dept: URBAN - METROPOLITAN AREA CLINIC 63 | Facility: CLINIC | Age: 69
End: 2024-09-10
Payer: MEDICARE

## 2024-09-10 VITALS
DIASTOLIC BLOOD PRESSURE: 84 MMHG | OXYGEN SATURATION: 98 % | BODY MASS INDEX: 25.98 KG/M2 | SYSTOLIC BLOOD PRESSURE: 124 MMHG | HEIGHT: 74 IN | WEIGHT: 202.4 LBS | HEART RATE: 88 BPM | TEMPERATURE: 97.8 F

## 2024-09-10 DIAGNOSIS — R15.9 FULL INCONTINENCE-FECES: ICD-10-CM

## 2024-09-10 DIAGNOSIS — Z90.49 HISTORY OF HEMICOLECTOMY: ICD-10-CM

## 2024-09-10 DIAGNOSIS — R14.0 BLOATING: ICD-10-CM

## 2024-09-10 DIAGNOSIS — Z87.19 HISTORY OF SMALL BOWEL OBSTRUCTION: ICD-10-CM

## 2024-09-10 PROBLEM — 1086911000119107: Status: ACTIVE | Noted: 2024-09-10

## 2024-09-10 PROCEDURE — 99214 OFFICE O/P EST MOD 30 MIN: CPT

## 2024-09-10 RX ORDER — MESALAMINE 1.2 G/1
4 TABLETS WITH MEAL TABLET, DELAYED RELEASE ORAL ONCE A DAY
Qty: 120 TABLET | Refills: 1 | OUTPATIENT
Start: 2024-09-10 | End: 2024-11-08

## 2024-09-10 RX ORDER — CHOLESTYRAMINE 4 G/9G
POWDER, FOR SUSPENSION ORAL
Qty: 90 EACH | Status: ACTIVE | COMMUNITY

## 2024-09-10 RX ORDER — METOPROLOL SUCCINATE 25 MG/1
TABLET, EXTENDED RELEASE ORAL ONCE A DAY
Refills: 0 | Status: ACTIVE | COMMUNITY
Start: 2022-05-13

## 2024-09-10 RX ORDER — TAMSULOSIN HYDROCHLORIDE 0.4 MG/1
CAPSULE ORAL
Qty: 90 CAPSULE | Status: ACTIVE | COMMUNITY

## 2024-09-10 RX ORDER — ALLOPURINOL 300 MG/1
TABLET ORAL ONCE A DAY
Refills: 0 | Status: ACTIVE | COMMUNITY
Start: 2022-05-13

## 2024-09-10 RX ORDER — ONDANSETRON HYDROCHLORIDE 4 MG/1
1 TABLET TABLET, FILM COATED ORAL
Qty: 2 | Refills: 0 | Status: ACTIVE | COMMUNITY
Start: 2023-11-07

## 2024-09-10 RX ORDER — EVOLOCUMAB 140 MG/ML
INJECTION, SOLUTION SUBCUTANEOUS
Qty: 6 MILLILITER | Status: ACTIVE | COMMUNITY

## 2024-09-10 RX ORDER — APIXABAN 5 MG/1
TABLET, FILM COATED ORAL
Qty: 180 TABLET | Status: ACTIVE | COMMUNITY

## 2024-09-10 RX ORDER — OMEPRAZOLE 40 MG/1
TWICE A DAY CAPSULE, DELAYED RELEASE ORAL TWICE A DAY
Refills: 3 | Status: ACTIVE | COMMUNITY
Start: 2017-03-20

## 2024-09-10 RX ORDER — TOBRAMYCIN 3 MG/ML
1 DROP EVERY 2 HOURS FIRST DAY THEN EVERY 4 HOURS WHILE AWAKE FOR 5 DAYS SOLUTION OPHTHALMIC
Qty: 10 MILLILITER | Refills: 0 | Status: ACTIVE | COMMUNITY

## 2024-09-10 RX ORDER — EVOLOCUMAB 140 MG/ML
EVERY 2 WEEKS INJECTION, SOLUTION SUBCUTANEOUS TAKE AS DIRECTED
Refills: 0 | Status: ACTIVE | COMMUNITY
Start: 2022-05-13

## 2024-09-10 RX ORDER — UREA 10 %
TAKE 1 TABLET BY MOUTH EVERY DAY LOTION (ML) TOPICAL
Qty: 90 EACH | Refills: 0 | Status: ACTIVE | COMMUNITY

## 2024-09-10 RX ORDER — OMEPRAZOLE 20 MG/1
CAPSULE, DELAYED RELEASE ORAL
Qty: 90 CAPSULE | Status: ACTIVE | COMMUNITY

## 2024-09-10 NOTE — HPI-PREVIOUS PROCEDURES
Colonoscopy 12/5/2023; - Nonbleeding internal hemorrhoids - Mild diverticulosis in the rectosigmoid colon. There was no evidence of diverticular bleeding. - No specimens collected. - The procedure was aborted due to restricted mobility of the colon. .

## 2024-09-10 NOTE — HPI-PREVIOUS IMAGING
Abdomen 2 views 12/6/2023; - Impression; 1. No bowel obstruction, free air, or significant retained stool within the colon . CT abdomen/pelvis without contrast 5/2/2022; - Impression; - No obstruction. There are findings concerning for a calcified mesenteric mass measuring approximately 5.5 x 4.2 cm. Scan with copious oral and IV contrast . CT abdomen/pelvis with contrast 5/6/2022; - Impression; 1. Calcified mass is suggested at the root of the right lower quadrant small bowel mesentery, with adjacent thickened ileal loops suboptimally evaluated due to the absence of contrast opacification. Dedicated CT enterography evaluation is indicated for further evaluation. 2. No acute bowel obstruction or intraperitoneal free air is seen 3. Hepatomegaly with hepatic steatosis  CT colonography 1/4/2024; - Impression; diverticular stricture with evidence of chronic diverticulitis involving the sigmoid colon prevented air from distending the colon proximally. No discrete colonic mass is identified.

## 2024-09-10 NOTE — HPI-PREVIOUS LABS
3/14/2024 labs; - CBC; RBC 3.87, hemoglobin 11.9, hematocrit 36.4, eosinophil percent 13.8, absolute eosinophil 0.9, otherwise within normal limits - PT/INR; PT 20.5 - BMP; CO2 32, anion gap 1, glucose 106, otherwise within normal limits .

## 2024-09-10 NOTE — HPI-ZZZTODAY'S VISIT
Patient is a very pleasant 69-year-old male presents for abdominal pain and bloating.  He is a patient of Dr. Mcneill.  Last seen by Margarito Suarez PA-C on 3/26/2024.  Past medical history significant for hypertension, hyperlipidemia, prostate cancer, tumor of the abdomen 2022.  Past surgical history significant for hemicolectomy May 2022 followed by open colectomy low anterior resection with splenic flexure takedown 3/4/2024, orthopedic procedures, cholecystectomy, cystoscopy.  Last colonoscopy 12/5/2023.  Family history noncontributory. Patient has been following for a diverticular stricture and recurrent diverticulitis.  He had surgery with Dr. Lima within open low anterior resection and splenic flexure takedown left ureterolysis and extensive lysis of adhesions.  For this reason he was hospitalized from 3/4/2024 through 3/15/2024.  He continues to follow with Dr. Lima.  There is talk of possible ostomy reversal in the future.  Patient presents for follow-up.  He relates that 3 months ago he had takedown of his ostomy.  Ever since the surgery his had severe fecal incontinence and watery bowel movements.  He has also had severe bloating especially postprandially.   He has seen Dr. Lima since his surgery and was cleared.  He has been following with his PCP since discharge and at one point was diagnosed with C diff.  He was placed on an antibiotic. He had moderate improvement with this. He had repeat stool testing that was negative for C diff.  He was put on cholestyramine and sees very little difference.  He denies dysphagia, dyspepsia, pyrosis, unintentional weight loss, melena, hematochezia. These records are not available for review. We will request them. It does not sound like he has had imaging since his last Ct colonography

## 2024-10-09 ENCOUNTER — OFFICE VISIT (OUTPATIENT)
Dept: URBAN - METROPOLITAN AREA CLINIC 63 | Facility: CLINIC | Age: 69
End: 2024-10-09

## 2024-10-23 ENCOUNTER — TELEPHONE ENCOUNTER (OUTPATIENT)
Dept: URBAN - METROPOLITAN AREA CLINIC 64 | Facility: CLINIC | Age: 69
End: 2024-10-23

## 2024-10-24 ENCOUNTER — OFFICE VISIT (OUTPATIENT)
Dept: URBAN - METROPOLITAN AREA CLINIC 63 | Facility: CLINIC | Age: 69
End: 2024-10-24

## 2024-11-18 ENCOUNTER — OFFICE VISIT (OUTPATIENT)
Dept: URBAN - METROPOLITAN AREA CLINIC 63 | Facility: CLINIC | Age: 69
End: 2024-11-18
Payer: COMMERCIAL

## 2024-11-18 VITALS
WEIGHT: 205.4 LBS | BODY MASS INDEX: 26.36 KG/M2 | TEMPERATURE: 98.2 F | DIASTOLIC BLOOD PRESSURE: 97 MMHG | OXYGEN SATURATION: 97 % | HEART RATE: 81 BPM | SYSTOLIC BLOOD PRESSURE: 145 MMHG | HEIGHT: 74 IN

## 2024-11-18 DIAGNOSIS — R15.9 FULL INCONTINENCE-FECES: ICD-10-CM

## 2024-11-18 DIAGNOSIS — Z90.49 HISTORY OF HEMICOLECTOMY: ICD-10-CM

## 2024-11-18 PROCEDURE — 99214 OFFICE O/P EST MOD 30 MIN: CPT

## 2024-11-18 RX ORDER — EVOLOCUMAB 140 MG/ML
EVERY 2 WEEKS INJECTION, SOLUTION SUBCUTANEOUS TAKE AS DIRECTED
Refills: 0 | Status: ACTIVE | COMMUNITY
Start: 2022-05-13

## 2024-11-18 RX ORDER — CHOLESTYRAMINE 4 G/9G
POWDER, FOR SUSPENSION ORAL
Qty: 90 EACH | Status: ACTIVE | COMMUNITY

## 2024-11-18 RX ORDER — ALLOPURINOL 300 MG/1
TABLET ORAL ONCE A DAY
Refills: 0 | Status: ACTIVE | COMMUNITY
Start: 2022-05-13

## 2024-11-18 RX ORDER — TAMSULOSIN HYDROCHLORIDE 0.4 MG/1
CAPSULE ORAL
Qty: 90 CAPSULE | Status: ACTIVE | COMMUNITY

## 2024-11-18 RX ORDER — CHOLESTYRAMINE 4 G/9G
1 PACKET MIXED WITH WATER OR NON-CARBONATED DRINK POWDER, FOR SUSPENSION ORAL
Qty: 360 | Refills: 3

## 2024-11-18 RX ORDER — APIXABAN 5 MG/1
TABLET, FILM COATED ORAL
Qty: 180 TABLET | Status: ACTIVE | COMMUNITY

## 2024-11-18 RX ORDER — OMEPRAZOLE 20 MG/1
CAPSULE, DELAYED RELEASE ORAL
Qty: 90 CAPSULE | Status: ACTIVE | COMMUNITY

## 2024-11-18 RX ORDER — METOPROLOL SUCCINATE 25 MG/1
TABLET, EXTENDED RELEASE ORAL ONCE A DAY
Refills: 0 | Status: ACTIVE | COMMUNITY
Start: 2022-05-13

## 2024-11-18 RX ORDER — EVOLOCUMAB 140 MG/ML
INJECTION, SOLUTION SUBCUTANEOUS
Qty: 6 MILLILITER | Status: ACTIVE | COMMUNITY

## 2024-11-18 RX ORDER — OMEPRAZOLE 40 MG/1
TWICE A DAY CAPSULE, DELAYED RELEASE ORAL TWICE A DAY
Refills: 3 | Status: ACTIVE | COMMUNITY
Start: 2017-03-20

## 2024-11-18 RX ORDER — TOBRAMYCIN 3 MG/ML
1 DROP EVERY 2 HOURS FIRST DAY THEN EVERY 4 HOURS WHILE AWAKE FOR 5 DAYS SOLUTION OPHTHALMIC
Qty: 10 MILLILITER | Refills: 0 | Status: ACTIVE | COMMUNITY

## 2024-11-18 RX ORDER — UREA 10 %
TAKE 1 TABLET BY MOUTH EVERY DAY LOTION (ML) TOPICAL
Qty: 90 EACH | Refills: 0 | Status: ACTIVE | COMMUNITY

## 2024-11-18 NOTE — HPI-ZZZTODAY'S VISIT
Patient is a 69-year-old male who presents today for follow-up on diarrhea and fecal incontinence.  Patient has a history of hemicolectomy in May 2022 followed by an open colectomy low anterior resection with splenic flexure takedown on 3/4/2024 with Dr. Lima.  He then had an ostomy reversal in June 2024.  Since then he has had complaints of diarrhea and being unable to control his bowel movements.  He has tried cholestyramine up to 3 times a day without symptom relief.  At 1 point he was positive for C. difficile and received treatment.  His last C. difficile test done in August was negative.  He continued to be symptomatic and was given a trial of mesalamine which he took but states his symptoms remained unchanged.  He has talked to his PCP and colorectal surgeon about his symptoms as well.  He is on magnesium supplements for low magnesium so I encouraged him to talk to his PCP about this.  Currently he tells me he is taking cholestyramine 1 packet daily, loperamide 2 capsules twice daily, Lomotil 1 tablet 3 times daily and morphine 10 mg 3 times daily which was given to him by the surgeon.  He states that despite all this medication he continues to experience daily diarrhea and fecal incontinence. . Colonoscopy 12/5/2023 - Nonbleeding internal hemorrhoids - Mild diverticulosis in the rectosigmoid colon, no evidence of diverticular bleeding - No specimens collected - Procedure aborted due to restricted mobility of colon . CT colonography 1/4/24 - Diverticular stricture with evidence of chronic diverticulitis involving sigmoid colon prevented air from distending the colon proximally, no discrete colonic mass identified . Labs 10/17/2024 - CMP: Glucose 103, total bilirubin 1.6 otherwise unremarkable - Magnesium within normal limits . Lab stool studies 8/30/2024 - C. difficile toxin not detected - Fecal leukocyte stain not detected . Labs 3/14/2024 - CBCD: RBC 3.87, hemoglobin 11.9, hematocrit 36.4, percent eosinophils 13.8, absolute eosinophils 0.9 otherwise unremarkable - BMP: CO2 32, anion gap 1, glucose 106 otherwise unremarkable

## 2024-12-09 ENCOUNTER — TELEPHONE ENCOUNTER (OUTPATIENT)
Dept: URBAN - METROPOLITAN AREA CLINIC 23 | Facility: CLINIC | Age: 69
End: 2024-12-09

## 2025-01-15 ENCOUNTER — OFFICE VISIT (OUTPATIENT)
Dept: URBAN - METROPOLITAN AREA CLINIC 57 | Facility: CLINIC | Age: 70
End: 2025-01-15

## 2025-02-12 ENCOUNTER — OFFICE VISIT (OUTPATIENT)
Dept: URBAN - METROPOLITAN AREA CLINIC 63 | Facility: CLINIC | Age: 70
End: 2025-02-12
Payer: COMMERCIAL

## 2025-02-12 VITALS
DIASTOLIC BLOOD PRESSURE: 86 MMHG | OXYGEN SATURATION: 98 % | HEART RATE: 74 BPM | HEIGHT: 74 IN | BODY MASS INDEX: 24.31 KG/M2 | TEMPERATURE: 98.7 F | WEIGHT: 189.4 LBS | SYSTOLIC BLOOD PRESSURE: 139 MMHG

## 2025-02-12 DIAGNOSIS — K58.0 IRRITABLE BOWEL SYNDROME WITH DIARRHEA: ICD-10-CM

## 2025-02-12 DIAGNOSIS — Z90.49 HISTORY OF HEMICOLECTOMY: ICD-10-CM

## 2025-02-12 DIAGNOSIS — D3A.8 NEUROENDOCRINE TUMOR: ICD-10-CM

## 2025-02-12 PROCEDURE — 99214 OFFICE O/P EST MOD 30 MIN: CPT | Performed by: INTERNAL MEDICINE

## 2025-02-12 RX ORDER — APIXABAN 5 MG/1
TABLET, FILM COATED ORAL
Qty: 180 TABLET | Status: ACTIVE | COMMUNITY

## 2025-02-12 RX ORDER — OMEPRAZOLE 20 MG/1
CAPSULE, DELAYED RELEASE ORAL
Qty: 90 CAPSULE | Status: ACTIVE | COMMUNITY

## 2025-02-12 RX ORDER — UREA 10 %
TAKE 1 TABLET BY MOUTH EVERY DAY LOTION (ML) TOPICAL
Qty: 90 EACH | Refills: 0 | Status: ACTIVE | COMMUNITY

## 2025-02-12 RX ORDER — CHOLESTYRAMINE 4 G/9G
1 PACKET MIXED WITH WATER OR NON-CARBONATED DRINK POWDER, FOR SUSPENSION ORAL
Qty: 360 | Refills: 3 | Status: ACTIVE | COMMUNITY

## 2025-02-12 RX ORDER — TOBRAMYCIN 3 MG/ML
1 DROP EVERY 2 HOURS FIRST DAY THEN EVERY 4 HOURS WHILE AWAKE FOR 5 DAYS SOLUTION OPHTHALMIC
Qty: 10 MILLILITER | Refills: 0 | Status: ACTIVE | COMMUNITY

## 2025-02-12 RX ORDER — METOPROLOL SUCCINATE 25 MG/1
TABLET, EXTENDED RELEASE ORAL ONCE A DAY
Refills: 0 | Status: ACTIVE | COMMUNITY
Start: 2022-05-13

## 2025-02-12 RX ORDER — OMEPRAZOLE 40 MG/1
TWICE A DAY CAPSULE, DELAYED RELEASE ORAL TWICE A DAY
Refills: 3 | Status: ACTIVE | COMMUNITY
Start: 2017-03-20

## 2025-02-12 RX ORDER — TAMSULOSIN HYDROCHLORIDE 0.4 MG/1
CAPSULE ORAL
Qty: 90 CAPSULE | Status: ACTIVE | COMMUNITY

## 2025-02-12 RX ORDER — EVOLOCUMAB 140 MG/ML
INJECTION, SOLUTION SUBCUTANEOUS
Qty: 6 MILLILITER | Status: ACTIVE | COMMUNITY

## 2025-02-12 RX ORDER — ALLOPURINOL 300 MG/1
TABLET ORAL ONCE A DAY
Refills: 0 | Status: ACTIVE | COMMUNITY
Start: 2022-05-13

## 2025-02-12 RX ORDER — EVOLOCUMAB 140 MG/ML
EVERY 2 WEEKS INJECTION, SOLUTION SUBCUTANEOUS TAKE AS DIRECTED
Refills: 0 | Status: ACTIVE | COMMUNITY
Start: 2022-05-13

## 2025-02-12 NOTE — HPI-TODAY'S VISIT:
Seen in follow-up for history of chronic diarrhea. He continues to have anywhere between 4-10 bowel movements a day sometimes at night as well. He has had multiple opinions including at [facility], [facility] with no real change in his therapy. He is currently on cholestyramine, Lomotil as needed. He is also on long-acting octreotide for history of neuroendocrine tumor. Recently had an CT scan and a liver biopsy which confirms metastatic neuroendocrine tumor. He is due to see oncology Dr. Hu today.